# Patient Record
Sex: FEMALE | Race: WHITE | HISPANIC OR LATINO
[De-identification: names, ages, dates, MRNs, and addresses within clinical notes are randomized per-mention and may not be internally consistent; named-entity substitution may affect disease eponyms.]

---

## 2017-12-19 ENCOUNTER — RESULT REVIEW (OUTPATIENT)
Age: 27
End: 2017-12-19

## 2018-05-12 ENCOUNTER — EMERGENCY (EMERGENCY)
Facility: HOSPITAL | Age: 28
LOS: 0 days | Discharge: HOME | End: 2018-05-12
Attending: EMERGENCY MEDICINE | Admitting: EMERGENCY MEDICINE
Payer: MEDICAID

## 2018-05-12 VITALS
SYSTOLIC BLOOD PRESSURE: 109 MMHG | DIASTOLIC BLOOD PRESSURE: 78 MMHG | RESPIRATION RATE: 18 BRPM | HEART RATE: 76 BPM | OXYGEN SATURATION: 100 % | TEMPERATURE: 99 F

## 2018-05-12 VITALS — HEIGHT: 62 IN | WEIGHT: 179.9 LBS

## 2018-05-12 DIAGNOSIS — R30.0 DYSURIA: ICD-10-CM

## 2018-05-12 DIAGNOSIS — N39.0 URINARY TRACT INFECTION, SITE NOT SPECIFIED: ICD-10-CM

## 2018-05-12 LAB
ALBUMIN SERPL ELPH-MCNC: 3.8 G/DL — SIGNIFICANT CHANGE UP (ref 3.3–5)
ALP SERPL-CCNC: 51 U/L — SIGNIFICANT CHANGE UP (ref 40–120)
ALT FLD-CCNC: 29 U/L — SIGNIFICANT CHANGE UP (ref 12–78)
ANION GAP SERPL CALC-SCNC: 7 MMOL/L — SIGNIFICANT CHANGE UP (ref 5–17)
APPEARANCE UR: CLEAR — SIGNIFICANT CHANGE UP
AST SERPL-CCNC: 20 U/L — SIGNIFICANT CHANGE UP (ref 15–37)
BACTERIA # UR AUTO: (no result)
BASOPHILS # BLD AUTO: 0.03 K/UL — SIGNIFICANT CHANGE UP (ref 0–0.2)
BASOPHILS NFR BLD AUTO: 0.5 % — SIGNIFICANT CHANGE UP (ref 0–2)
BILIRUB SERPL-MCNC: 0.4 MG/DL — SIGNIFICANT CHANGE UP (ref 0.2–1.2)
BILIRUB UR-MCNC: NEGATIVE — SIGNIFICANT CHANGE UP
BUN SERPL-MCNC: 16 MG/DL — SIGNIFICANT CHANGE UP (ref 7–23)
CALCIUM SERPL-MCNC: 8.6 MG/DL — SIGNIFICANT CHANGE UP (ref 8.5–10.1)
CHLORIDE SERPL-SCNC: 107 MMOL/L — SIGNIFICANT CHANGE UP (ref 96–108)
CO2 SERPL-SCNC: 25 MMOL/L — SIGNIFICANT CHANGE UP (ref 22–31)
COLOR SPEC: YELLOW — SIGNIFICANT CHANGE UP
CREAT SERPL-MCNC: 0.83 MG/DL — SIGNIFICANT CHANGE UP (ref 0.5–1.3)
DIFF PNL FLD: NEGATIVE — SIGNIFICANT CHANGE UP
EOSINOPHIL # BLD AUTO: 0.22 K/UL — SIGNIFICANT CHANGE UP (ref 0–0.5)
EOSINOPHIL NFR BLD AUTO: 3.5 % — SIGNIFICANT CHANGE UP (ref 0–6)
EPI CELLS # UR: SIGNIFICANT CHANGE UP
GLUCOSE SERPL-MCNC: 95 MG/DL — SIGNIFICANT CHANGE UP (ref 70–99)
GLUCOSE UR QL: NEGATIVE MG/DL — SIGNIFICANT CHANGE UP
HCT VFR BLD CALC: 36.4 % — SIGNIFICANT CHANGE UP (ref 34.5–45)
HGB BLD-MCNC: 12.5 G/DL — SIGNIFICANT CHANGE UP (ref 11.5–15.5)
IMM GRANULOCYTES NFR BLD AUTO: 0.3 % — SIGNIFICANT CHANGE UP (ref 0–1.5)
KETONES UR-MCNC: NEGATIVE — SIGNIFICANT CHANGE UP
LEUKOCYTE ESTERASE UR-ACNC: (no result)
LYMPHOCYTES # BLD AUTO: 1.89 K/UL — SIGNIFICANT CHANGE UP (ref 1–3.3)
LYMPHOCYTES # BLD AUTO: 30 % — SIGNIFICANT CHANGE UP (ref 13–44)
MCHC RBC-ENTMCNC: 29.3 PG — SIGNIFICANT CHANGE UP (ref 27–34)
MCHC RBC-ENTMCNC: 34.3 GM/DL — SIGNIFICANT CHANGE UP (ref 32–36)
MCV RBC AUTO: 85.2 FL — SIGNIFICANT CHANGE UP (ref 80–100)
MONOCYTES # BLD AUTO: 0.57 K/UL — SIGNIFICANT CHANGE UP (ref 0–0.9)
MONOCYTES NFR BLD AUTO: 9 % — SIGNIFICANT CHANGE UP (ref 2–14)
NEUTROPHILS # BLD AUTO: 3.58 K/UL — SIGNIFICANT CHANGE UP (ref 1.8–7.4)
NEUTROPHILS NFR BLD AUTO: 56.7 % — SIGNIFICANT CHANGE UP (ref 43–77)
NITRITE UR-MCNC: NEGATIVE — SIGNIFICANT CHANGE UP
NRBC # BLD: 0 /100 WBCS — SIGNIFICANT CHANGE UP (ref 0–0)
PH UR: 6.5 — SIGNIFICANT CHANGE UP (ref 5–8)
PLATELET # BLD AUTO: 222 K/UL — SIGNIFICANT CHANGE UP (ref 150–400)
POTASSIUM SERPL-MCNC: 3.6 MMOL/L — SIGNIFICANT CHANGE UP (ref 3.5–5.3)
POTASSIUM SERPL-SCNC: 3.6 MMOL/L — SIGNIFICANT CHANGE UP (ref 3.5–5.3)
PROT SERPL-MCNC: 7.5 GM/DL — SIGNIFICANT CHANGE UP (ref 6–8.3)
PROT UR-MCNC: NEGATIVE MG/DL — SIGNIFICANT CHANGE UP
RBC # BLD: 4.27 M/UL — SIGNIFICANT CHANGE UP (ref 3.8–5.2)
RBC # FLD: 12.7 % — SIGNIFICANT CHANGE UP (ref 10.3–14.5)
SODIUM SERPL-SCNC: 139 MMOL/L — SIGNIFICANT CHANGE UP (ref 135–145)
SP GR SPEC: 1.01 — SIGNIFICANT CHANGE UP (ref 1.01–1.02)
UROBILINOGEN FLD QL: NEGATIVE MG/DL — SIGNIFICANT CHANGE UP
WBC # BLD: 6.31 K/UL — SIGNIFICANT CHANGE UP (ref 3.8–10.5)
WBC # FLD AUTO: 6.31 K/UL — SIGNIFICANT CHANGE UP (ref 3.8–10.5)
WBC UR QL: (no result)

## 2018-05-12 PROCEDURE — 99284 EMERGENCY DEPT VISIT MOD MDM: CPT

## 2018-05-12 RX ORDER — NITROFURANTOIN MACROCRYSTAL 50 MG
1 CAPSULE ORAL
Qty: 14 | Refills: 0
Start: 2018-05-12 | End: 2018-05-18

## 2018-05-12 RX ORDER — SODIUM CHLORIDE 9 MG/ML
3 INJECTION INTRAMUSCULAR; INTRAVENOUS; SUBCUTANEOUS ONCE
Qty: 0 | Refills: 0 | Status: COMPLETED | OUTPATIENT
Start: 2018-05-12 | End: 2018-05-12

## 2018-05-12 RX ORDER — NITROFURANTOIN MACROCRYSTAL 50 MG
100 CAPSULE ORAL ONCE
Qty: 0 | Refills: 0 | Status: COMPLETED | OUTPATIENT
Start: 2018-05-12 | End: 2018-05-12

## 2018-05-12 RX ORDER — KETOROLAC TROMETHAMINE 30 MG/ML
30 SYRINGE (ML) INJECTION ONCE
Qty: 0 | Refills: 0 | Status: DISCONTINUED | OUTPATIENT
Start: 2018-05-12 | End: 2018-05-12

## 2018-05-12 RX ADMIN — Medication 30 MILLIGRAM(S): at 21:10

## 2018-05-12 RX ADMIN — Medication 100 MILLIGRAM(S): at 21:50

## 2018-05-12 RX ADMIN — SODIUM CHLORIDE 3 MILLILITER(S): 9 INJECTION INTRAMUSCULAR; INTRAVENOUS; SUBCUTANEOUS at 21:11

## 2018-05-12 NOTE — ED STATDOCS - OBJECTIVE STATEMENT
26 y/o Female with no pertinent PMHx or PSHx presents to ED c/o suprapubic abd pain since 20 minutes ago. Pt was out to dinner when symptoms began. +Dysuria since an hour ago. No N/V/D, no fever, no chills, no back pain. No medications taken PTA. LKMP 8 days ago.

## 2018-05-12 NOTE — ED STATDOCS - ATTENDING CONTRIBUTION TO CARE
Attending Contribution to Care: I, Janelle De La O, performed the initial face to face bedside interview with this patient regarding history of present illness, review of symptoms and relevant past medical, social and family history.  I completed an independent physical examination.  I was the initial provider who evaluated this patient. I have signed out the follow up of any pending tests (i.e. labs, radiological studies) to the ACP.  I have communicated the patient’s plan of care and disposition with the ACP.

## 2019-03-19 NOTE — ED ADULT NURSE NOTE - DOES PATIENT HAVE ADVANCE DIRECTIVE
No
Anxiety    CAD (Coronary Artery Disease)    Depression    Diabetes Mellitus Type II  x 7 yrs  Heart Attack  19 yrs ago  HTN (Hypertension)    Morbid Obesity    Nontoxic Nodular Goiter    Sleep Apnea  cpap - [facility] grp.

## 2020-09-25 ENCOUNTER — APPOINTMENT (OUTPATIENT)
Dept: ANTEPARTUM | Facility: CLINIC | Age: 30
End: 2020-09-25
Payer: MEDICAID

## 2020-09-25 ENCOUNTER — ASOB RESULT (OUTPATIENT)
Age: 30
End: 2020-09-25

## 2020-09-25 PROCEDURE — 76813 OB US NUCHAL MEAS 1 GEST: CPT | Mod: 59

## 2020-11-11 ENCOUNTER — APPOINTMENT (OUTPATIENT)
Dept: ANTEPARTUM | Facility: CLINIC | Age: 30
End: 2020-11-11
Payer: MEDICAID

## 2020-11-11 ENCOUNTER — ASOB RESULT (OUTPATIENT)
Age: 30
End: 2020-11-11

## 2020-11-11 PROBLEM — Z00.00 ENCOUNTER FOR PREVENTIVE HEALTH EXAMINATION: Status: ACTIVE | Noted: 2020-11-11

## 2020-11-11 PROCEDURE — 99072 ADDL SUPL MATRL&STAF TM PHE: CPT

## 2020-11-11 PROCEDURE — 76805 OB US >/= 14 WKS SNGL FETUS: CPT

## 2020-11-16 ENCOUNTER — ASOB RESULT (OUTPATIENT)
Age: 30
End: 2020-11-16

## 2020-11-16 ENCOUNTER — APPOINTMENT (OUTPATIENT)
Dept: ANTEPARTUM | Facility: CLINIC | Age: 30
End: 2020-11-16
Payer: MEDICAID

## 2020-11-16 PROCEDURE — 76816 OB US FOLLOW-UP PER FETUS: CPT

## 2020-11-16 PROCEDURE — 99072 ADDL SUPL MATRL&STAF TM PHE: CPT

## 2021-03-03 ENCOUNTER — ASOB RESULT (OUTPATIENT)
Age: 31
End: 2021-03-03

## 2021-03-03 ENCOUNTER — APPOINTMENT (OUTPATIENT)
Dept: ANTEPARTUM | Facility: CLINIC | Age: 31
End: 2021-03-03
Payer: MEDICAID

## 2021-03-03 PROCEDURE — 76816 OB US FOLLOW-UP PER FETUS: CPT

## 2021-03-03 PROCEDURE — 99072 ADDL SUPL MATRL&STAF TM PHE: CPT

## 2021-03-25 ENCOUNTER — OUTPATIENT (OUTPATIENT)
Dept: INPATIENT UNIT | Facility: HOSPITAL | Age: 31
LOS: 1 days | Discharge: HOME | End: 2021-03-25
Payer: MEDICAID

## 2021-03-25 DIAGNOSIS — O26.899 OTHER SPECIFIED PREGNANCY RELATED CONDITIONS, UNSPECIFIED TRIMESTER: ICD-10-CM

## 2021-03-25 LAB — SARS-COV-2 RNA SPEC QL NAA+PROBE: SIGNIFICANT CHANGE UP

## 2021-03-25 PROCEDURE — U0003: CPT

## 2021-03-25 PROCEDURE — U0005: CPT

## 2021-03-26 ENCOUNTER — INPATIENT (INPATIENT)
Facility: HOSPITAL | Age: 31
LOS: 2 days | Discharge: HOME | DRG: 560 | End: 2021-03-29
Attending: OBSTETRICS & GYNECOLOGY | Admitting: OBSTETRICS & GYNECOLOGY
Payer: MEDICAID

## 2021-03-26 VITALS — HEIGHT: 60 IN | WEIGHT: 178.57 LBS

## 2021-03-26 DIAGNOSIS — O26.899 OTHER SPECIFIED PREGNANCY RELATED CONDITIONS, UNSPECIFIED TRIMESTER: ICD-10-CM

## 2021-03-26 LAB
BASOPHILS # BLD AUTO: 0.01 K/UL — SIGNIFICANT CHANGE UP (ref 0–0.2)
BASOPHILS NFR BLD AUTO: 0.1 % — SIGNIFICANT CHANGE UP (ref 0–2)
EOSINOPHIL # BLD AUTO: 0.06 K/UL — SIGNIFICANT CHANGE UP (ref 0–0.5)
EOSINOPHIL NFR BLD AUTO: 0.9 % — SIGNIFICANT CHANGE UP (ref 0–6)
HCT VFR BLD CALC: 32.5 % — LOW (ref 34.5–45)
HGB BLD-MCNC: 10.6 G/DL — LOW (ref 11.5–15.5)
IMM GRANULOCYTES NFR BLD AUTO: 0.3 % — SIGNIFICANT CHANGE UP (ref 0–1.5)
LYMPHOCYTES # BLD AUTO: 1.76 K/UL — SIGNIFICANT CHANGE UP (ref 1–3.3)
LYMPHOCYTES # BLD AUTO: 25.6 % — SIGNIFICANT CHANGE UP (ref 13–44)
MCHC RBC-ENTMCNC: 28.4 PG — SIGNIFICANT CHANGE UP (ref 27–34)
MCHC RBC-ENTMCNC: 32.6 GM/DL — SIGNIFICANT CHANGE UP (ref 32–36)
MCV RBC AUTO: 87.1 FL — SIGNIFICANT CHANGE UP (ref 80–100)
MONOCYTES # BLD AUTO: 0.68 K/UL — SIGNIFICANT CHANGE UP (ref 0–0.9)
MONOCYTES NFR BLD AUTO: 9.9 % — SIGNIFICANT CHANGE UP (ref 2–14)
NEUTROPHILS # BLD AUTO: 4.34 K/UL — SIGNIFICANT CHANGE UP (ref 1.8–7.4)
NEUTROPHILS NFR BLD AUTO: 63.2 % — SIGNIFICANT CHANGE UP (ref 43–77)
PLATELET # BLD AUTO: 190 K/UL — SIGNIFICANT CHANGE UP (ref 150–400)
RBC # BLD: 3.73 M/UL — LOW (ref 3.8–5.2)
RBC # FLD: 13.9 % — SIGNIFICANT CHANGE UP (ref 10.3–14.5)
WBC # BLD: 6.87 K/UL — SIGNIFICANT CHANGE UP (ref 3.8–10.5)
WBC # FLD AUTO: 6.87 K/UL — SIGNIFICANT CHANGE UP (ref 3.8–10.5)

## 2021-03-26 PROCEDURE — 85014 HEMATOCRIT: CPT

## 2021-03-26 PROCEDURE — C1889: CPT

## 2021-03-26 PROCEDURE — 36415 COLL VENOUS BLD VENIPUNCTURE: CPT

## 2021-03-26 PROCEDURE — 86769 SARS-COV-2 COVID-19 ANTIBODY: CPT

## 2021-03-26 PROCEDURE — 85018 HEMOGLOBIN: CPT

## 2021-03-26 PROCEDURE — 80053 COMPREHEN METABOLIC PANEL: CPT

## 2021-03-26 PROCEDURE — G0463: CPT

## 2021-03-26 PROCEDURE — 83605 ASSAY OF LACTIC ACID: CPT

## 2021-03-26 PROCEDURE — 85384 FIBRINOGEN ACTIVITY: CPT

## 2021-03-26 PROCEDURE — 85025 COMPLETE CBC W/AUTO DIFF WBC: CPT

## 2021-03-26 PROCEDURE — 94760 N-INVAS EAR/PLS OXIMETRY 1: CPT

## 2021-03-26 PROCEDURE — 86901 BLOOD TYPING SEROLOGIC RH(D): CPT

## 2021-03-26 PROCEDURE — 80048 BASIC METABOLIC PNL TOTAL CA: CPT

## 2021-03-26 PROCEDURE — 86850 RBC ANTIBODY SCREEN: CPT

## 2021-03-26 PROCEDURE — 85610 PROTHROMBIN TIME: CPT

## 2021-03-26 PROCEDURE — 59050 FETAL MONITOR W/REPORT: CPT

## 2021-03-26 PROCEDURE — 86780 TREPONEMA PALLIDUM: CPT

## 2021-03-26 PROCEDURE — 86900 BLOOD TYPING SEROLOGIC ABO: CPT

## 2021-03-26 PROCEDURE — 85730 THROMBOPLASTIN TIME PARTIAL: CPT

## 2021-03-26 RX ORDER — BUTORPHANOL TARTRATE 2 MG/ML
2 INJECTION, SOLUTION INTRAMUSCULAR; INTRAVENOUS ONCE
Refills: 0 | Status: DISCONTINUED | OUTPATIENT
Start: 2021-03-26 | End: 2021-03-27

## 2021-03-26 RX ORDER — OXYTOCIN 10 UNIT/ML
333.33 VIAL (ML) INJECTION
Qty: 20 | Refills: 0 | Status: DISCONTINUED | OUTPATIENT
Start: 2021-03-26 | End: 2021-03-29

## 2021-03-26 RX ORDER — SODIUM CHLORIDE 9 MG/ML
1000 INJECTION, SOLUTION INTRAVENOUS
Refills: 0 | Status: DISCONTINUED | OUTPATIENT
Start: 2021-03-26 | End: 2021-03-28

## 2021-03-26 RX ORDER — CITRIC ACID/SODIUM CITRATE 300-500 MG
30 SOLUTION, ORAL ORAL ONCE
Refills: 0 | Status: DISCONTINUED | OUTPATIENT
Start: 2021-03-26 | End: 2021-03-28

## 2021-03-27 ENCOUNTER — TRANSCRIPTION ENCOUNTER (OUTPATIENT)
Age: 31
End: 2021-03-27

## 2021-03-27 LAB
ANION GAP SERPL CALC-SCNC: 10 MMOL/L — SIGNIFICANT CHANGE UP (ref 5–17)
APTT BLD: 28.8 SEC — SIGNIFICANT CHANGE UP (ref 27.5–35.5)
BASOPHILS # BLD AUTO: 0.02 K/UL — SIGNIFICANT CHANGE UP (ref 0–0.2)
BASOPHILS NFR BLD AUTO: 0.2 % — SIGNIFICANT CHANGE UP (ref 0–2)
BUN SERPL-MCNC: 9 MG/DL — SIGNIFICANT CHANGE UP (ref 7–23)
CALCIUM SERPL-MCNC: 8.2 MG/DL — LOW (ref 8.5–10.1)
CHLORIDE SERPL-SCNC: 112 MMOL/L — HIGH (ref 96–108)
CO2 SERPL-SCNC: 18 MMOL/L — LOW (ref 22–31)
COVID-19 SPIKE DOMAIN AB INTERP: POSITIVE
COVID-19 SPIKE DOMAIN ANTIBODY RESULT: 170 U/ML — HIGH
CREAT SERPL-MCNC: 0.69 MG/DL — SIGNIFICANT CHANGE UP (ref 0.5–1.3)
EOSINOPHIL # BLD AUTO: 0 K/UL — SIGNIFICANT CHANGE UP (ref 0–0.5)
EOSINOPHIL NFR BLD AUTO: 0 % — SIGNIFICANT CHANGE UP (ref 0–6)
FIBRINOGEN PPP-MCNC: 478 MG/DL — SIGNIFICANT CHANGE UP (ref 290–520)
GLUCOSE SERPL-MCNC: 91 MG/DL — SIGNIFICANT CHANGE UP (ref 70–99)
HCT VFR BLD CALC: 29.6 % — LOW (ref 34.5–45)
HGB BLD-MCNC: 9.6 G/DL — LOW (ref 11.5–15.5)
IMM GRANULOCYTES NFR BLD AUTO: 0.4 % — SIGNIFICANT CHANGE UP (ref 0–1.5)
INR BLD: 1.04 RATIO — SIGNIFICANT CHANGE UP (ref 0.88–1.16)
LACTATE SERPL-SCNC: 2.5 MMOL/L — HIGH (ref 0.7–2)
LYMPHOCYTES # BLD AUTO: 0.67 K/UL — LOW (ref 1–3.3)
LYMPHOCYTES # BLD AUTO: 5.5 % — LOW (ref 13–44)
MCHC RBC-ENTMCNC: 28.5 PG — SIGNIFICANT CHANGE UP (ref 27–34)
MCHC RBC-ENTMCNC: 32.4 GM/DL — SIGNIFICANT CHANGE UP (ref 32–36)
MCV RBC AUTO: 87.8 FL — SIGNIFICANT CHANGE UP (ref 80–100)
MONOCYTES # BLD AUTO: 1.08 K/UL — HIGH (ref 0–0.9)
MONOCYTES NFR BLD AUTO: 8.9 % — SIGNIFICANT CHANGE UP (ref 2–14)
NEUTROPHILS # BLD AUTO: 10.34 K/UL — HIGH (ref 1.8–7.4)
NEUTROPHILS NFR BLD AUTO: 85 % — HIGH (ref 43–77)
PLATELET # BLD AUTO: 176 K/UL — SIGNIFICANT CHANGE UP (ref 150–400)
POTASSIUM SERPL-MCNC: 4.1 MMOL/L — SIGNIFICANT CHANGE UP (ref 3.5–5.3)
POTASSIUM SERPL-SCNC: 4.1 MMOL/L — SIGNIFICANT CHANGE UP (ref 3.5–5.3)
PROTHROM AB SERPL-ACNC: 12.1 SEC — SIGNIFICANT CHANGE UP (ref 10.6–13.6)
RBC # BLD: 3.37 M/UL — LOW (ref 3.8–5.2)
RBC # FLD: 13.9 % — SIGNIFICANT CHANGE UP (ref 10.3–14.5)
SARS-COV-2 IGG+IGM SERPL QL IA: 170 U/ML — HIGH
SARS-COV-2 IGG+IGM SERPL QL IA: POSITIVE
SODIUM SERPL-SCNC: 140 MMOL/L — SIGNIFICANT CHANGE UP (ref 135–145)
T PALLIDUM AB TITR SER: NEGATIVE — SIGNIFICANT CHANGE UP
WBC # BLD: 12.16 K/UL — HIGH (ref 3.8–10.5)
WBC # FLD AUTO: 12.16 K/UL — HIGH (ref 3.8–10.5)

## 2021-03-27 RX ORDER — AMPICILLIN TRIHYDRATE 250 MG
CAPSULE ORAL
Refills: 0 | Status: DISCONTINUED | OUTPATIENT
Start: 2021-03-27 | End: 2021-03-29

## 2021-03-27 RX ORDER — SIMETHICONE 80 MG/1
80 TABLET, CHEWABLE ORAL EVERY 4 HOURS
Refills: 0 | Status: DISCONTINUED | OUTPATIENT
Start: 2021-03-28 | End: 2021-03-29

## 2021-03-27 RX ORDER — IBUPROFEN 200 MG
600 TABLET ORAL EVERY 6 HOURS
Refills: 0 | Status: COMPLETED | OUTPATIENT
Start: 2021-03-28 | End: 2022-02-24

## 2021-03-27 RX ORDER — INFLUENZA VIRUS VACCINE 15; 15; 15; 15 UG/.5ML; UG/.5ML; UG/.5ML; UG/.5ML
0.5 SUSPENSION INTRAMUSCULAR ONCE
Refills: 0 | Status: DISCONTINUED | OUTPATIENT
Start: 2021-03-27 | End: 2021-03-29

## 2021-03-27 RX ORDER — TETANUS TOXOID, REDUCED DIPHTHERIA TOXOID AND ACELLULAR PERTUSSIS VACCINE, ADSORBED 5; 2.5; 8; 8; 2.5 [IU]/.5ML; [IU]/.5ML; UG/.5ML; UG/.5ML; UG/.5ML
0.5 SUSPENSION INTRAMUSCULAR ONCE
Refills: 0 | Status: DISCONTINUED | OUTPATIENT
Start: 2021-03-28 | End: 2021-03-29

## 2021-03-27 RX ORDER — AMPICILLIN TRIHYDRATE 250 MG
2 CAPSULE ORAL EVERY 6 HOURS
Refills: 0 | Status: DISCONTINUED | OUTPATIENT
Start: 2021-03-28 | End: 2021-03-29

## 2021-03-27 RX ORDER — OXYTOCIN 10 UNIT/ML
333.33 VIAL (ML) INJECTION
Qty: 20 | Refills: 0 | Status: DISCONTINUED | OUTPATIENT
Start: 2021-03-28 | End: 2021-03-29

## 2021-03-27 RX ORDER — OXYTOCIN 10 UNIT/ML
2 VIAL (ML) INJECTION
Qty: 30 | Refills: 0 | Status: DISCONTINUED | OUTPATIENT
Start: 2021-03-27 | End: 2021-03-29

## 2021-03-27 RX ORDER — OXYCODONE HYDROCHLORIDE 5 MG/1
5 TABLET ORAL
Refills: 0 | Status: DISCONTINUED | OUTPATIENT
Start: 2021-03-28 | End: 2021-03-29

## 2021-03-27 RX ORDER — MAGNESIUM HYDROXIDE 400 MG/1
30 TABLET, CHEWABLE ORAL
Refills: 0 | Status: DISCONTINUED | OUTPATIENT
Start: 2021-03-28 | End: 2021-03-29

## 2021-03-27 RX ORDER — DIPHENHYDRAMINE HCL 50 MG
25 CAPSULE ORAL EVERY 6 HOURS
Refills: 0 | Status: DISCONTINUED | OUTPATIENT
Start: 2021-03-28 | End: 2021-03-29

## 2021-03-27 RX ORDER — LANOLIN
1 OINTMENT (GRAM) TOPICAL EVERY 6 HOURS
Refills: 0 | Status: DISCONTINUED | OUTPATIENT
Start: 2021-03-28 | End: 2021-03-29

## 2021-03-27 RX ORDER — HYDROCORTISONE 1 %
1 OINTMENT (GRAM) TOPICAL EVERY 6 HOURS
Refills: 0 | Status: DISCONTINUED | OUTPATIENT
Start: 2021-03-28 | End: 2021-03-29

## 2021-03-27 RX ORDER — DIBUCAINE 1 %
1 OINTMENT (GRAM) RECTAL EVERY 6 HOURS
Refills: 0 | Status: DISCONTINUED | OUTPATIENT
Start: 2021-03-28 | End: 2021-03-29

## 2021-03-27 RX ORDER — KETOROLAC TROMETHAMINE 30 MG/ML
30 SYRINGE (ML) INJECTION ONCE
Refills: 0 | Status: DISCONTINUED | OUTPATIENT
Start: 2021-03-27 | End: 2021-03-27

## 2021-03-27 RX ORDER — OXYCODONE HYDROCHLORIDE 5 MG/1
5 TABLET ORAL ONCE
Refills: 0 | Status: DISCONTINUED | OUTPATIENT
Start: 2021-03-28 | End: 2021-03-29

## 2021-03-27 RX ORDER — GENTAMICIN SULFATE 40 MG/ML
230 VIAL (ML) INJECTION ONCE
Refills: 0 | Status: COMPLETED | OUTPATIENT
Start: 2021-03-28 | End: 2021-03-28

## 2021-03-27 RX ORDER — AMPICILLIN TRIHYDRATE 250 MG
2 CAPSULE ORAL ONCE
Refills: 0 | Status: COMPLETED | OUTPATIENT
Start: 2021-03-27 | End: 2021-03-27

## 2021-03-27 RX ORDER — SODIUM CHLORIDE 9 MG/ML
3 INJECTION INTRAMUSCULAR; INTRAVENOUS; SUBCUTANEOUS EVERY 8 HOURS
Refills: 0 | Status: DISCONTINUED | OUTPATIENT
Start: 2021-03-28 | End: 2021-03-29

## 2021-03-27 RX ORDER — ACETAMINOPHEN 500 MG
1000 TABLET ORAL ONCE
Refills: 0 | Status: COMPLETED | OUTPATIENT
Start: 2021-03-27 | End: 2021-03-27

## 2021-03-27 RX ORDER — OXYTOCIN 10 UNIT/ML
10 VIAL (ML) INJECTION ONCE
Refills: 0 | Status: COMPLETED | OUTPATIENT
Start: 2021-03-27 | End: 2021-03-27

## 2021-03-27 RX ORDER — BENZOCAINE 10 %
1 GEL (GRAM) MUCOUS MEMBRANE EVERY 6 HOURS
Refills: 0 | Status: DISCONTINUED | OUTPATIENT
Start: 2021-03-28 | End: 2021-03-29

## 2021-03-27 RX ORDER — AER TRAVELER 0.5 G/1
1 SOLUTION RECTAL; TOPICAL EVERY 4 HOURS
Refills: 0 | Status: DISCONTINUED | OUTPATIENT
Start: 2021-03-28 | End: 2021-03-29

## 2021-03-27 RX ORDER — COPPER 313.4 MG/1
1 INTRAUTERINE DEVICE INTRAUTERINE ONCE
Refills: 0 | Status: COMPLETED | OUTPATIENT
Start: 2021-03-27 | End: 2021-03-27

## 2021-03-27 RX ORDER — PRAMOXINE HYDROCHLORIDE 150 MG/15G
1 AEROSOL, FOAM RECTAL EVERY 4 HOURS
Refills: 0 | Status: DISCONTINUED | OUTPATIENT
Start: 2021-03-28 | End: 2021-03-29

## 2021-03-27 RX ORDER — GENTAMICIN SULFATE 40 MG/ML
230 VIAL (ML) INJECTION ONCE
Refills: 0 | Status: COMPLETED | OUTPATIENT
Start: 2021-03-27 | End: 2021-03-27

## 2021-03-27 RX ORDER — ACETAMINOPHEN 500 MG
975 TABLET ORAL
Refills: 0 | Status: DISCONTINUED | OUTPATIENT
Start: 2021-03-28 | End: 2021-03-29

## 2021-03-27 RX ADMIN — Medication 100 MILLIGRAM(S): at 22:48

## 2021-03-27 RX ADMIN — Medication 100 MILLIGRAM(S): at 18:18

## 2021-03-27 RX ADMIN — COPPER 1 EACH: 313.4 INTRAUTERINE DEVICE INTRAUTERINE at 18:59

## 2021-03-27 RX ADMIN — Medication 30 MILLIGRAM(S): at 19:21

## 2021-03-27 RX ADMIN — SODIUM CHLORIDE 125 MILLILITER(S): 9 INJECTION, SOLUTION INTRAVENOUS at 00:12

## 2021-03-27 RX ADMIN — BUTORPHANOL TARTRATE 2 MILLIGRAM(S): 2 INJECTION, SOLUTION INTRAMUSCULAR; INTRAVENOUS at 00:45

## 2021-03-27 RX ADMIN — Medication 2 MILLIUNIT(S)/MIN: at 07:56

## 2021-03-27 RX ADMIN — Medication 400 MILLIGRAM(S): at 17:39

## 2021-03-27 RX ADMIN — BUTORPHANOL TARTRATE 2 MILLIGRAM(S): 2 INJECTION, SOLUTION INTRAMUSCULAR; INTRAVENOUS at 00:12

## 2021-03-27 RX ADMIN — Medication 10 UNIT(S): at 20:54

## 2021-03-27 RX ADMIN — Medication 2 GRAM(S): at 17:39

## 2021-03-27 RX ADMIN — Medication 30 MILLIGRAM(S): at 22:48

## 2021-03-27 RX ADMIN — Medication 1000 MILLIGRAM(S): at 17:40

## 2021-03-27 RX ADMIN — Medication 0.2 MILLIGRAM(S): at 20:40

## 2021-03-27 NOTE — DISCHARGE NOTE OB - CARE PROVIDER_API CALL
Hossein Jacob  OBSTETRICS AND GYNECOLOGY  [address] [facility], [address] [place] [address]  Phone: [phone]  Fax: [phone]  Established Patient  Follow Up Time: 1 month

## 2021-03-27 NOTE — CHART NOTE - NSCHARTNOTEFT_GEN_A_CORE
Patient had some VB s/p delivery  150cc of clots expressed on bimanual exam  Uterine fundus firm  VSS  Will straight cath to empty bladder and give methergine IM x1    D/W Dr. Hoff Patient had some VB s/p delivery  Patient feeling well, denies weakness or dizziness   300cc of clots expressed on bimanual exam  Uterine fundus firm  VSS  Will straight cath to empty bladder resulted in 550cc  Methergine IM x1, cytotec 1000 DE and 10IM pit given  STAT labs drawn  Continue to monitor     D/W Dr. Hoff Patient had some VB s/p delivery  Patient feeling well, denies weakness or dizziness   Clots expressed on bimanual exam - QBL 1125cc (in addition to 300cc at delivery)  Uterine fundus firm  VSS  Will straight cath to empty bladder resulted in 550cc  Methergine IM x1, cytotec 1000 NJ and 10IM pit given  STAT labs drawn  Continue to monitor     D/W Dr. Hoff

## 2021-03-27 NOTE — DISCHARGE NOTE OB - MEDICATION SUMMARY - MEDICATIONS TO TAKE
I will START or STAY ON the medications listed below when I get home from the hospital:    acetaminophen 325 mg oral tablet  -- 3 tab(s) by mouth   -- Indication: For pain    ibuprofen 600 mg oral tablet  -- 1 tab(s) by mouth every 6 hours, As needed, Temp greater or equal to 38C (100.4F), Mild Pain (1 - 3), Moderate Pain (4 - 6), Severe Pain (7 - 10)  -- Indication: For pain    Prenatal 19 ([place]) oral tablet  -- 1 tab(s) by mouth once a day  -- Indication: For vitamin

## 2021-03-27 NOTE — DISCHARGE NOTE OB - PLAN OF CARE
Please call your provider in 4-6 weeks to schedule your post partum visit. Take medications as directed, regular diet, activity as tolerated. Exclusive breast feeding for the first 6 months is recommended. Nothing per vagina for 6 weeks (incl. sex, douching, etc). If you have additional concerns, please inform your provider. rapid recovery

## 2021-03-27 NOTE — CHART NOTE - NSCHARTNOTEFT_GEN_A_CORE
Patient continues to have VB  Additional 50-100cc of clots removed  IUD removed  Will send BMP/lactate   Continue amp/gent 24hr and add clinda                           9.6    12.16 )-----------( 176      ( 27 Mar 2021 21:15 )             29.6     PT/INR - ( 27 Mar 2021 21:15 )   PT: 12.1 sec;   INR: 1.04 ratio       PTT - ( 27 Mar 2021 21:15 )  PTT:28.8 sec    D/W Dr. Hoff Patient continues to have VB  Additional 50-100cc of clots removed  IUD removed  Will send BMP/lactate   Continue amp/gent 24hr and add clinda                           9.6    12.16 )-----------( 176      ( 27 Mar 2021 21:15 )             29.6     PT/INR - ( 27 Mar 2021 21:15 )   PT: 12.1 sec;   INR: 1.04 ratio       PTT - ( 27 Mar 2021 21:15 )  PTT:28.8 sec    D/W Dr. Hoff      Attending Note     Patient assessed due to continued vaginal bleeding and sensation of chills. Repeat vaginal exam expressed small amount of clot and bleeding noted on pad. Bimanual massage performed and fundus firm.   Due to chorioamnionitis during labor and postpartum hemorrhage, IUD removed without difficulty.   Informed patient IUD will be replaced at PP visit in 6 wks. Expressed understanding   Plan   -BMP and lactate   -antibiotics x 24h PP   -blood and urine cultures if febrile     BEA Hoff

## 2021-03-27 NOTE — DISCHARGE NOTE OB - PATIENT PORTAL LINK FT
You can access the FollowMyHealth Patient Portal offered by [facility] by registering at the following website: [email or web address]. By joining [employer]’s FollowMyHealth portal, you will also be able to view your health information using other applications (apps) compatible with our system.

## 2021-03-27 NOTE — PROVIDER CONTACT NOTE (CRITICAL VALUE NOTIFICATION) - ASSESSMENT
Pt received from [employer] [employer] at 2352, vss, Temp 99.2, pt denies any complaints at present.

## 2021-03-27 NOTE — DISCHARGE NOTE OB - HOSPITAL COURSE
31 yo  at 39 4/7 weeks gestation presented c/o painful contractions yesterday, admitted at cervical dilation of 1.5cm.  She was then induced for prodromal labor with a cervical ripening balloon and cytotec.  Her CBC upon admission:                        10.6   6.87  )-----------( 190      ( 26 Mar 2021 23:25 )             32.5   COVID-19 PCR negative.  She progressed to 4cm when pitocin augmentation was started.  Amniorrhexis was performed at 11:16 am- light meconium stained fluid was seen.  Epidural anaesthesia was given for analgesia.  Pt progressed to full dilatation.

## 2021-03-27 NOTE — DISCHARGE NOTE OB - CARE PLAN
Principal Discharge DX:	 (normal spontaneous vaginal delivery)  Goal:	rapid recovery  Assessment and plan of treatment:	Please call your provider in 4-6 weeks to schedule your post partum visit. Take medications as directed, regular diet, activity as tolerated. Exclusive breast feeding for the first 6 months is recommended. Nothing per vagina for 6 weeks (incl. sex, douching, etc). If you have additional concerns, please inform your provider.

## 2021-03-28 LAB
ALBUMIN SERPL ELPH-MCNC: 2 G/DL — LOW (ref 3.3–5)
ALP SERPL-CCNC: 175 U/L — HIGH (ref 40–120)
ALT FLD-CCNC: 22 U/L — SIGNIFICANT CHANGE UP (ref 12–78)
ANION GAP SERPL CALC-SCNC: 4 MMOL/L — LOW (ref 5–17)
APTT BLD: 27.7 SEC — SIGNIFICANT CHANGE UP (ref 27.5–35.5)
AST SERPL-CCNC: 24 U/L — SIGNIFICANT CHANGE UP (ref 15–37)
BASOPHILS # BLD AUTO: 0.02 K/UL — SIGNIFICANT CHANGE UP (ref 0–0.2)
BASOPHILS NFR BLD AUTO: 0.2 % — SIGNIFICANT CHANGE UP (ref 0–2)
BILIRUB SERPL-MCNC: 0.5 MG/DL — SIGNIFICANT CHANGE UP (ref 0.2–1.2)
BUN SERPL-MCNC: 10 MG/DL — SIGNIFICANT CHANGE UP (ref 7–23)
CALCIUM SERPL-MCNC: 8.2 MG/DL — LOW (ref 8.5–10.1)
CHLORIDE SERPL-SCNC: 111 MMOL/L — HIGH (ref 96–108)
CO2 SERPL-SCNC: 23 MMOL/L — SIGNIFICANT CHANGE UP (ref 22–31)
COVID-19 SPIKE DOMAIN AB INTERP: POSITIVE
COVID-19 SPIKE DOMAIN ANTIBODY RESULT: 130 U/ML — HIGH
CREAT SERPL-MCNC: 0.77 MG/DL — SIGNIFICANT CHANGE UP (ref 0.5–1.3)
EOSINOPHIL # BLD AUTO: 0.04 K/UL — SIGNIFICANT CHANGE UP (ref 0–0.5)
EOSINOPHIL NFR BLD AUTO: 0.4 % — SIGNIFICANT CHANGE UP (ref 0–6)
GLUCOSE SERPL-MCNC: 61 MG/DL — LOW (ref 70–99)
HCT VFR BLD CALC: 27.2 % — LOW (ref 34.5–45)
HGB BLD-MCNC: 8.9 G/DL — LOW (ref 11.5–15.5)
IMM GRANULOCYTES NFR BLD AUTO: 0.4 % — SIGNIFICANT CHANGE UP (ref 0–1.5)
INR BLD: 1.09 RATIO — SIGNIFICANT CHANGE UP (ref 0.88–1.16)
LACTATE SERPL-SCNC: 1.4 MMOL/L — SIGNIFICANT CHANGE UP (ref 0.7–2)
LYMPHOCYTES # BLD AUTO: 1.54 K/UL — SIGNIFICANT CHANGE UP (ref 1–3.3)
LYMPHOCYTES # BLD AUTO: 16.7 % — SIGNIFICANT CHANGE UP (ref 13–44)
MCHC RBC-ENTMCNC: 28.5 PG — SIGNIFICANT CHANGE UP (ref 27–34)
MCHC RBC-ENTMCNC: 32.7 GM/DL — SIGNIFICANT CHANGE UP (ref 32–36)
MCV RBC AUTO: 87.2 FL — SIGNIFICANT CHANGE UP (ref 80–100)
MONOCYTES # BLD AUTO: 0.93 K/UL — HIGH (ref 0–0.9)
MONOCYTES NFR BLD AUTO: 10.1 % — SIGNIFICANT CHANGE UP (ref 2–14)
NEUTROPHILS # BLD AUTO: 6.67 K/UL — SIGNIFICANT CHANGE UP (ref 1.8–7.4)
NEUTROPHILS NFR BLD AUTO: 72.2 % — SIGNIFICANT CHANGE UP (ref 43–77)
PLATELET # BLD AUTO: 157 K/UL — SIGNIFICANT CHANGE UP (ref 150–400)
POTASSIUM SERPL-MCNC: 4.2 MMOL/L — SIGNIFICANT CHANGE UP (ref 3.5–5.3)
POTASSIUM SERPL-SCNC: 4.2 MMOL/L — SIGNIFICANT CHANGE UP (ref 3.5–5.3)
PROT SERPL-MCNC: 5.1 GM/DL — LOW (ref 6–8.3)
PROTHROM AB SERPL-ACNC: 12.6 SEC — SIGNIFICANT CHANGE UP (ref 10.6–13.6)
RBC # BLD: 3.12 M/UL — LOW (ref 3.8–5.2)
RBC # FLD: 14.1 % — SIGNIFICANT CHANGE UP (ref 10.3–14.5)
SARS-COV-2 IGG+IGM SERPL QL IA: 130 U/ML — HIGH
SARS-COV-2 IGG+IGM SERPL QL IA: POSITIVE
SODIUM SERPL-SCNC: 138 MMOL/L — SIGNIFICANT CHANGE UP (ref 135–145)
WBC # BLD: 9.24 K/UL — SIGNIFICANT CHANGE UP (ref 3.8–10.5)
WBC # FLD AUTO: 9.24 K/UL — SIGNIFICANT CHANGE UP (ref 3.8–10.5)

## 2021-03-28 RX ORDER — OXYCODONE HYDROCHLORIDE 5 MG/1
5 TABLET ORAL ONCE
Refills: 0 | Status: DISCONTINUED | OUTPATIENT
Start: 2021-03-28 | End: 2021-03-29

## 2021-03-28 RX ORDER — IBUPROFEN 200 MG
600 TABLET ORAL EVERY 6 HOURS
Refills: 0 | Status: DISCONTINUED | OUTPATIENT
Start: 2021-03-28 | End: 2021-03-29

## 2021-03-28 RX ADMIN — Medication 0.2 MILLIGRAM(S): at 06:16

## 2021-03-28 RX ADMIN — Medication 100 MILLIGRAM(S): at 18:28

## 2021-03-28 RX ADMIN — Medication 216 GRAM(S): at 06:42

## 2021-03-28 RX ADMIN — Medication 0.2 MILLIGRAM(S): at 00:28

## 2021-03-28 RX ADMIN — Medication 0.2 MILLIGRAM(S): at 12:10

## 2021-03-28 RX ADMIN — Medication 975 MILLIGRAM(S): at 09:20

## 2021-03-28 RX ADMIN — Medication 100 MILLIGRAM(S): at 06:16

## 2021-03-28 RX ADMIN — Medication 216 GRAM(S): at 12:09

## 2021-03-28 RX ADMIN — Medication 600 MILLIGRAM(S): at 06:42

## 2021-03-28 RX ADMIN — Medication 1 TABLET(S): at 09:19

## 2021-03-28 RX ADMIN — Medication 600 MILLIGRAM(S): at 12:10

## 2021-03-28 RX ADMIN — Medication 216 GRAM(S): at 17:38

## 2021-03-28 RX ADMIN — Medication 975 MILLIGRAM(S): at 21:21

## 2021-03-28 RX ADMIN — Medication 975 MILLIGRAM(S): at 16:06

## 2021-03-28 RX ADMIN — Medication 0.2 MILLIGRAM(S): at 18:28

## 2021-03-28 RX ADMIN — Medication 975 MILLIGRAM(S): at 22:00

## 2021-03-28 RX ADMIN — Medication 600 MILLIGRAM(S): at 05:58

## 2021-03-28 RX ADMIN — Medication 600 MILLIGRAM(S): at 18:28

## 2021-03-28 RX ADMIN — Medication 216 GRAM(S): at 00:33

## 2021-03-28 RX ADMIN — Medication 975 MILLIGRAM(S): at 16:05

## 2021-03-28 RX ADMIN — Medication 100 MILLIGRAM(S): at 13:12

## 2021-03-28 RX ADMIN — SODIUM CHLORIDE 3 MILLILITER(S): 9 INJECTION INTRAMUSCULAR; INTRAVENOUS; SUBCUTANEOUS at 21:22

## 2021-03-28 NOTE — PROGRESS NOTE ADULT - SUBJECTIVE AND OBJECTIVE BOX
PPD # 1  Pt without complaints  Vital Signs Last 24 Hrs  T(C): 37 (28 Mar 2021 05:47), Max: 37.3 (27 Mar 2021 23:57)  T(F): 98.6 (28 Mar 2021 05:47), Max: 99.2 (27 Mar 2021 23:57)  HR: 82 (28 Mar 2021 05:47) (59 - 88)  BP: 116/81 (28 Mar 2021 05:47) (104/54 - 140/58)  BP(mean): --  RR: 18 (28 Mar 2021 05:47) (16 - 18)  SpO2: 97% (28 Mar 2021 05:47) (97% - 98%)  abdomen soft, utx firm/NT   small amount of lochia  extremities 2+edema, NT    Labs                        8.9    9.24  )-----------( 157      ( 28 Mar 2021 07:44 )             27.2     A/P s/p forceps delivery, complicated by shoulder dystocia and PPH, EBL 1,400 cc. s/p cytotec, pitocin and methergine. Stable. Uterus is firm with minimal vaginal bleeding. Afebrile. H/H stable. Continue to observe.

## 2021-03-28 NOTE — PROGRESS NOTE ADULT - ASSESSMENT
Patient is a 31yo  s/p  at 39w4d post partum day#1.  Delivery complicated by shoulder dystocia, forceps assist, chorioamnionitis and post partum hemorrhage.     #Post partum  Continue the current pain medication.   Encourage ambulation and a regular diet.   Encourage mother baby bonding.  : voiding spontaneously.  GI: +flatus, stool softeners PRN.  DVTppx: ambulation.  Tentative d/c planning for PPD#2-3.    #Chorioamnionitis  Febrile during labor  On amp/gent/clinda for 24hr  WBC 6.9->12.2, f/u AM labs  Lactate 2.5->2.1, repeat AM labs and continue IV hydration   Patient has been afebrile, if spikes, consider cultures    #PPH  Uterine atony post partum, requiring uterotonics   Now firm fundus and minimal VB  F/U AM hgb and continue to monitor  Patient is a 31yo  s/p  at 39w4d post partum day#1.  Delivery complicated by shoulder dystocia, forceps assist, chorioamnionitis and post partum hemorrhage.     #Post partum  Continue the current pain medication.   Encourage ambulation and a regular diet.   Encourage mother baby bonding.  : voiding spontaneously.  GI: -flatus, stool softeners PRN.  DVTppx: ambulation.  Tentative d/c planning for PPD#2-3.    #Chorioamnionitis  Febrile during labor  On amp/gent/clinda for 24hr  WBC 6.9->12.2, f/u AM labs  Lactate 2.5->2.1, repeat AM labs and continue IV hydration   Patient has been afebrile, if spikes, consider cultures    #PPH  Uterine atony post partum, requiring uterotonics   Now firm fundus and minimal VB  F/U AM Hgb and continue to monitor

## 2021-03-28 NOTE — PROGRESS NOTE ADULT - SUBJECTIVE AND OBJECTIVE BOX
Postpartum Note Vaginal Delivery  Patient is a 29yo  s/p  at 39w4d post partum day#1.  Delivery complicated by shoulder dystocia, forceps assist, chorioamnionitis and post partum hemorrhage.     Subjective:  No acute events overnight.   Patient is tolerating diet and denies N/V.   Patient still has slight vaginal bleeding which is decreasing in amount.   She is breastfeeding and the baby is latching on.    Urinating appropriately.   -BM/+flatus.    Physical exam:  Vital Signs Last 24 Hrs  T(C): 37.3 (27 Mar 2021 23:57), Max: 37.3 (27 Mar 2021 23:57)  T(F): 99.2 (27 Mar 2021 23:57), Max: 99.2 (27 Mar 2021 23:57)  HR: 76 (27 Mar 2021 23:57) (59 - 88)  BP: 119/73 (27 Mar 2021 23:57) (104/54 - 140/58)  RR: 18 (27 Mar 2021 23:57) (16 - 18)  SpO2: 98% (27 Mar 2021 23:57) (98% - 98%)    General: NAD  Heart: RRR  Lungs: CTABL  Breast: non tender, not engorged   Abdomen: Soft, nontender, no distension, firm uterine fundus  Ext: No DVT signs, warm extremities    LABS:                        9.6    12.16 )-----------( 176      ( 27 Mar 2021 21:15 )             29.6     -    140  |  112<H>  |  9   ----------------------------<  91  4.1   |  18<L>  |  0.69    Ca    8.2<L>      27 Mar 2021 22:40    Lactate, Blood: 2.5: Elevated lactate. Consider ordering follow-up lactate to trend. mmol/L (21 @ 22:40)  Lactate, Blood: 2.1: Elevated lactate. Consider ordering follow-up lactate to trend. mmol/L (21 @ 01:56)    PT/INR - ( 27 Mar 2021 21:15 )   PT: 12.1 sec;   INR: 1.04 ratio       PTT - ( 27 Mar 2021 21:15 )  PTT:28.8 sec         Postpartum Note Vaginal Delivery  Patient is a 29yo  s/p  at 39w4d post partum day#1.  Delivery complicated by shoulder dystocia, forceps assist, chorioamnionitis and post partum hemorrhage.     Subjective:  No acute events overnight.   Patient is tolerating diet and denies N/V.   Patient still has slight vaginal bleeding which is decreasing in amount.   She is breastfeeding and the baby is latching on.    Urinating appropriately.   -BM/-flatus.    Physical exam:  Vital Signs Last 24 Hrs  T(C): 37.3 (27 Mar 2021 23:57), Max: 37.3 (27 Mar 2021 23:57)  T(F): 99.2 (27 Mar 2021 23:57), Max: 99.2 (27 Mar 2021 23:57)  HR: 76 (27 Mar 2021 23:57) (59 - 88)  BP: 119/73 (27 Mar 2021 23:57) (104/54 - 140/58)  RR: 18 (27 Mar 2021 23:57) (16 - 18)  SpO2: 98% (27 Mar 2021 23:57) (98% - 98%)    General: NAD  Heart: RRR  Lungs: CTABL  Breast: non tender, not engorged   Abdomen: Soft, nontender, no distension, firm uterine fundus  Ext: No DVT signs, warm extremities    LABS:                        9.6    12.16 )-----------( 176      ( 27 Mar 2021 21:15 )             29.6     -    140  |  112<H>  |  9   ----------------------------<  91  4.1   |  18<L>  |  0.69    Ca    8.2<L>      27 Mar 2021 22:40    Lactate, Blood: 2.5: Elevated lactate. Consider ordering follow-up lactate to trend. mmol/L (21 @ 22:40)  Lactate, Blood: 2.1: Elevated lactate. Consider ordering follow-up lactate to trend. mmol/L (21 @ 01:56)    PT/INR - ( 27 Mar 2021 21:15 )   PT: 12.1 sec;   INR: 1.04 ratio       PTT - ( 27 Mar 2021 21:15 )  PTT:28.8 sec

## 2021-03-29 VITALS
OXYGEN SATURATION: 98 % | SYSTOLIC BLOOD PRESSURE: 95 MMHG | RESPIRATION RATE: 18 BRPM | DIASTOLIC BLOOD PRESSURE: 62 MMHG | TEMPERATURE: 97 F | HEART RATE: 74 BPM

## 2021-03-29 LAB
HCT VFR BLD CALC: 23.8 % — LOW (ref 34.5–45)
HGB BLD-MCNC: 7.6 G/DL — LOW (ref 11.5–15.5)

## 2021-03-29 RX ORDER — IBUPROFEN 200 MG
1 TABLET ORAL
Qty: 0 | Refills: 0 | DISCHARGE
Start: 2021-03-29

## 2021-03-29 RX ORDER — ACETAMINOPHEN 500 MG
3 TABLET ORAL
Qty: 0 | Refills: 0 | DISCHARGE
Start: 2021-03-29

## 2021-03-29 RX ADMIN — Medication 1 TABLET(S): at 09:32

## 2021-03-29 RX ADMIN — Medication 975 MILLIGRAM(S): at 09:32

## 2021-03-29 RX ADMIN — Medication 975 MILLIGRAM(S): at 10:15

## 2021-03-29 RX ADMIN — Medication 975 MILLIGRAM(S): at 15:50

## 2021-03-29 RX ADMIN — SODIUM CHLORIDE 3 MILLILITER(S): 9 INJECTION INTRAMUSCULAR; INTRAVENOUS; SUBCUTANEOUS at 05:52

## 2021-03-29 RX ADMIN — Medication 975 MILLIGRAM(S): at 15:51

## 2021-03-29 RX ADMIN — Medication 600 MILLIGRAM(S): at 00:06

## 2021-03-29 RX ADMIN — Medication 600 MILLIGRAM(S): at 12:38

## 2021-03-29 RX ADMIN — Medication 600 MILLIGRAM(S): at 06:00

## 2021-03-29 RX ADMIN — Medication 216 GRAM(S): at 00:06

## 2021-03-29 RX ADMIN — Medication 216 GRAM(S): at 05:58

## 2021-03-29 RX ADMIN — Medication 600 MILLIGRAM(S): at 01:00

## 2021-03-29 RX ADMIN — Medication 600 MILLIGRAM(S): at 13:20

## 2021-03-29 NOTE — PROGRESS NOTE ADULT - SUBJECTIVE AND OBJECTIVE BOX
Name: HERVE VIDAL  MRN: [id number]  Date Admitted: 21  Location: [facility] [place] 241 01 ([facility] [place])  Attending: Hossein Jacob Chau    All: No Known Allergies    Post Partum: Vaginal Delivery Progress Note    HERVE BOWSER is a 30y  s/p  PPD #2 of a viable infant. Intrapartum course complicated by chorioamnionitis and post-partum hemorrhage requiring cytotec and IM pitocin.     SUBJECTIVE:  No acute events overnight. Pain is well controlled with PRN pain medication. No problems with ambulating, voiding, or PO intake. Has had flatus but no BM. Denies N/V. Patient is having normal lochia which is decreasing.      OBJECTIVE:  Physical exam:  General: AOx3, NAD.  Heart: RRR. S1S2.  Lungs: CTABL. Good airflow bilaterally.   Abdomen: +BS, Soft, appropriately tender to palpitation, firm uterine fundus at umbilicus.  Ext: No calf tenderness bilaterally, Ugo's sign negative, warm extremities.    Vital Signs Last 24 Hrs  T(C): 36.8 (28 Mar 2021 23:40), Max: 37 (28 Mar 2021 12:00)  T(F): 98.3 (28 Mar 2021 23:40), Max: 98.6 (28 Mar 2021 12:00)  HR: 66 (28 Mar 2021 23:40) (64 - 80)  BP: 102/67 (28 Mar 2021 23:40) (100/60 - 120/65)  RR: 16 (28 Mar 2021 23:40) (16 - 18)  SpO2: 99% (28 Mar 2021 23:40) (97% - 99%)    LABS:                        7.6    x     )-----------( x        ( 29 Mar 2021 07:41 )             23.8

## 2021-03-29 NOTE — PROGRESS NOTE ADULT - ASSESSMENT
30y  s/p  PPD #2 of a viable infant. Intrapartum course complicated by chorioamnionitis and post-partum hemorrhage requiring cytotec and IM pitocin.  Post-partum H&H reviewed, Hgb 7.6. Asymptomatic. No headache or dizziness. VSS.     Plan:  - Routine post-partum care.  - Encourage ambulation - if pt is not ambulating please use SCDs for DVT ppx.  - Regular diet.  - Encourage mother-baby interaction.  - Plan for discharge today 30y  s/p  PPD #2 of a viable infant. Intrapartum course complicated by chorioamnionitis and post-partum hemorrhage requiring cytotec and IM pitocin.  Post-partum H&H reviewed, Hgb 7.6. Asymptomatic. No headache or dizziness. VSS.     Plan:  - Routine post-partum care.  - Encourage ambulation - if pt is not ambulating please use SCDs for DVT ppx.  - Regular diet.  - Encourage mother-baby interaction.  - Plan for discharge today    d/w Dr. Jacob

## 2021-04-01 DIAGNOSIS — O47.9 FALSE LABOR, UNSPECIFIED: ICD-10-CM

## 2021-04-01 DIAGNOSIS — Z3A.39 39 WEEKS GESTATION OF PREGNANCY: ICD-10-CM

## 2021-04-01 DIAGNOSIS — O41.1230 CHORIOAMNIONITIS, THIRD TRIMESTER, NOT APPLICABLE OR UNSPECIFIED: ICD-10-CM

## 2021-04-01 DIAGNOSIS — O47.1 FALSE LABOR AT OR AFTER 37 COMPLETED WEEKS OF GESTATION: ICD-10-CM

## 2024-02-07 ENCOUNTER — ASOB RESULT (OUTPATIENT)
Age: 34
End: 2024-02-07

## 2024-02-07 ENCOUNTER — APPOINTMENT (OUTPATIENT)
Dept: ANTEPARTUM | Facility: CLINIC | Age: 34
End: 2024-02-07
Payer: MEDICAID

## 2024-02-07 PROCEDURE — 76813 OB US NUCHAL MEAS 1 GEST: CPT

## 2024-03-21 ENCOUNTER — EMERGENCY (EMERGENCY)
Facility: HOSPITAL | Age: 34
LOS: 0 days | Discharge: HOME | End: 2024-03-21
Attending: EMERGENCY MEDICINE
Payer: MEDICAID

## 2024-03-21 VITALS — HEIGHT: 63 IN | WEIGHT: 179.9 LBS

## 2024-03-21 VITALS — OXYGEN SATURATION: 99 % | RESPIRATION RATE: 18 BRPM | HEART RATE: 103 BPM

## 2024-03-21 DIAGNOSIS — O98.512 OTHER VIRAL DISEASES COMPLICATING PREGNANCY, SECOND TRIMESTER: ICD-10-CM

## 2024-03-21 DIAGNOSIS — B34.9 VIRAL INFECTION, UNSPECIFIED: ICD-10-CM

## 2024-03-21 DIAGNOSIS — Z3A.16 16 WEEKS GESTATION OF PREGNANCY: ICD-10-CM

## 2024-03-21 DIAGNOSIS — B97.89 OTHER VIRAL AGENTS AS THE CAUSE OF DISEASES CLASSIFIED ELSEWHERE: ICD-10-CM

## 2024-03-21 DIAGNOSIS — M79.10 MYALGIA, UNSPECIFIED SITE: ICD-10-CM

## 2024-03-21 PROCEDURE — 99283 EMERGENCY DEPT VISIT LOW MDM: CPT

## 2024-03-21 PROCEDURE — 99284 EMERGENCY DEPT VISIT MOD MDM: CPT | Mod: 25

## 2024-03-21 RX ORDER — ACETAMINOPHEN 500 MG
975 TABLET ORAL ONCE
Refills: 0 | Status: COMPLETED | OUTPATIENT
Start: 2024-03-21 | End: 2024-03-21

## 2024-03-21 RX ADMIN — Medication 975 MILLIGRAM(S): at 04:24

## 2024-03-21 RX ADMIN — Medication 75 MILLIGRAM(S): at 04:24

## 2024-03-21 NOTE — ED ADULT NURSE NOTE - OBJECTIVE STATEMENT
The pt is a 32 y/o female ambulatory A&OX4 presenting to the ED c/o sore throat, ear pain, fever, n/v X2 days. Pt reports that she is 4 months pregnant. Pt denies any pregnancy complications. Pt denies CP, SOB. The pt is a 32 y/o female ambulatory A&OX4 presenting to the ED c/o sore throat, ear pain, fever, n/v X2 days. Pt reports that she is 4 months pregnant. Pt denies any pregnancy complications. Pt denies CP, SOB.  used.

## 2024-03-21 NOTE — ED PROVIDER NOTE - NSFOLLOWUPINSTRUCTIONS_ED_ALL_ED_FT
Paciente: HERVE BOWSER  Profesional que asiste al paciente: ConnorLuis  Enfermedades virales en los adultos  Viral Illness, Adult    Los virus son gérmenes diminutos que entran en el organismo de angela persona y causan enfermedades. Hay muchos tipos de virus diferentes y causan muchas clases de enfermedades. Las enfermedades virales pueden ser leves o graves. Pueden afectar diferentes partes del cuerpo.    Entre las enfermedades frecuentes causadas por virus se incluyen los resfríos y la gripe. Además, las enfermedades virales abarcan afecciones graves, kim la infección por el VIH (virus de inmunodeficiencia humana) y el sida (síndrome de inmunodeficiencia adquirida). Se florez identificado unos pocos virus asociados con determinados tipos de cáncer.    ¿Cuáles son las causas?  Muchos tipos de virus pueden causar enfermedades. Los virus invaden las células del organismo, se multiplican y provocan que las células infectadas funcionen de manera anormal o mueran. Cuando estas células mueren, liberan más virus. Cuando esto ocurre, aparecen síntomas de la enfermedad, y el virus sigue diseminándose a otras células. Si el virus asume la función de la célula, puede hacer que esta se divida y prolifere de manera descontrolada. Trail ocurre cuando un virus causa cáncer.    Los diferentes virus ingresan al organismo de distintas formas. Puede contraer un virus de la siguiente manera:    Al ingerir alimentos o beber agua que florez entrado en contacto con el virus (están contaminados).  Al inhalar gotitas que angela persona infectada liberó en el aire al toser o estornudar.  Al tocar angela superficie contaminada con el virus y luego llevarse la mano a la boca, la nariz o los ojos.  Al ser venus por un insecto o mordido por un animal que son portadores del virus.  Al tener contacto sexual con angela persona infectada por el virus.  Al tener contacto con britney o líquidos que contienen el virus, ya sea a través de un lucila abierto o hank angela transfusión.    Si el virus ingresa al organismo, el sistema de defensa del cuerpo (sistema inmunitario) intentará combatirlo. Puede correr un riesgo más alto de tener angela enfermedad viral si tiene el sistema inmunitario debilitado.    ¿Cuáles son los signos o síntomas?  Puede tener los siguientes síntomas, dependiendo del tipo de virus y de la ubicación de las células que invade:    Virus del resfrío y de la gripe:    Fiebre.  Dolor de mahogany.  Dolor de garganta.  Kimberly musculares.  Nariz tapada (congestión nasal).  Tos.  Virus del aparato digestivo (gastrointestinales):    Fiebre.  Dolor en el abdomen.  Náuseas.  Diarrea.  Virus hepáticos (hepatitis):    Pérdida del apetito.  Cansancio.  La piel o las partes carola de los ojos se ponen elisabet (ictericia).  Virus del cerebro y la médula pinto:    Fiebre.  Dolor de mahogany.  Rigidez en el maxime.  Náuseas y vómitos.  Confusión o somnolencia.  Virus de la piel:    Verrugas.  Picazón.  Erupción cutánea.  Virus de transmisión sexual:    Secreción.  Hinchazón.  Enrojecimiento.  Erupción cutánea.    ¿Cómo se diagnostica?  Esta afección se puede diagnosticar en función de lo siguiente:    Síntomas.  Antecedentes médicos.  Examen físico.  Análisis de britney, angela muestra de mucosidad de los pulmones (muestra de esputo), muestra de heces o un hisopado de líquidos corporales o angela llaga de la piel (lesión).    ¿Cómo se trata?  Los virus pueden ser difíciles de tratar porque se hospedan en las células. Los antibióticos no tratan los virus porque estos medicamentos no llegan al interior de las células. El tratamiento de angela enfermedad viral puede incluir lo siguiente:    Descansar y beber abundantes líquidos.  Medicamentos para aliviar los síntomas. Estos pueden incluir medicamentos de venta james para el dolor y la fiebre, medicamentos para la tos o la congestión y medicamentos para aliviar la diarrea.  Medicamentos antivirales. Estos medicamentos están disponibles únicamente para ciertos tipos de virus. Pueden ayudar a aliviar los síntomas de la gripe, si se shyanne apenas comienza la infección. También hay medicamentos antivirales para la hepatitis y para el VIH y el sida.    Algunas enfermedades virales pueden evitarse con vacunas. Un ejemplo frecuente es la vacuna antigripal.    Siga estas instrucciones en [place] casa:      Medicamentos    Use los medicamentos de venta james y los recetados solamente kim se lo haya indicado el médico.  Si le recetaron un medicamento antiviral, tómelo kim se lo haya indicado el médico. No deje de jeet el antiviral aunque comience a sentirse mejor.  Infórmese sobre cuándo los antibióticos son necesarios y cuándo no. Los antibióticos no combaten a los virus. Si tiene angela infección viral y el médico chani que también tiene angela infección bacteriana, o que está en riesgo de contraerla, partha vez le recete un antibiótico.    No solicite angela receta para antibióticos si le florez diagnosticado angela enfermedad viral. Los antibióticos no harán que se cure más rápidamente.  Jeet antibióticos con frecuencia cuando no son necesarios puede derivar en resistencia a los antibióticos. Cuando esto ocurre, el medicamento pierde how[place] eficacia contra las bacterias que normalmente combate.        Instrucciones generales     Jyothi suficiente líquido para mantener la orina de color amarillo pálido.  Descanse todo lo que pueda.  Retome meseret actividades normales según lo indicado por el médico. Pregúntele al médico qué actividades son seguras para usted.  Concurra a todas las visitas de seguimiento kim se lo haya indicado el médico. Trail es importante.    ¿Cómo se previene?     Para reducir el riesgo de tener angela enfermedad viral:    Lávese las kathy frecuentemente con agua y jabón hank al menos 20 segundos. Use desinfectante para kathy si no dispone de agua y jabón.  Evite tocarse la nariz, los ojos y la boca, sobre todo si no se lavó las kathy recientemente.  Si un miembro de la kermit tiene angela infección viral, limpie todas las superficies de la casa que puedan williams estado en contacto con el virus. Use [place] y jabón. También puede usar lejía con agua agregada (diluido).  Manténgase lejos de las personas enfermas con síntomas de angela infección viral.  No comparta objetos tales kim cepillos de dientes y botellas de agua con otras personas.  Mantenga las vacunas al día. Trail incluye recibir la vacuna antigripal todos los años.  Siga angela dieta saludable y descanse lo suficiente.    Comuníquese con un médico si:  Tiene síntomas de angela enfermedad viral que no desaparecen.  Los síntomas regresan después de williams desaparecido.  Meseret síntomas empeoran.    Solicite ayuda inmediatamente si tiene:  Dificultad para respirar.  Dolor de mahogany intenso o rigidez en el maxime.  Vómitos abundantes o dolor en el abdomen.    Estos síntomas pueden representar un problema grave que constituye angela emergencia. No espere a caitie si los síntomas desaparecen. Solicite atención médica de inmediato. Comuníquese con el servicio de emergencias de howell localidad (911 en los [place]). No conduzca por meseret propios medios hasta el hospital.    Resumen  Los virus son tipos de gérmenes que entran en el organismo de angela persona y causan enfermedades. Las enfermedades virales pueden ser leves o graves. Pueden afectar diferentes partes del cuerpo.  Los virus pueden ser difíciles de tratar. Hay medicamentos para aliviar los síntomas y hay algunos medicamentos antivirales.  Si le recetaron un medicamento antiviral, tómelo kim se lo haya indicado el médico. No deje de jeet el antiviral aunque comience a sentirse mejor.  Comuníquese con un médico si tiene síntomas de angela enfermedad viral que no desaparecen.    NOTAS ADICIONALES E INSTRUCCIONES    Please follow up with your Primary MD in 24-48 hr.  Seek immediate medical care for any new/worsening signs or symptoms.     Document Released: 4/28/2017 Document Revised: 10/27/2020 Document Reviewed: 10/27/2020  Elsevier Interactive Patient Education ©2019 [employer]. This information is not intended to replace advice given to you by your health care provider. Make sure you discuss any questions you have with your health care provider.

## 2024-03-21 NOTE — ED PROVIDER NOTE - CLINICAL SUMMARY MEDICAL DECISION MAKING FREE TEXT BOX
Patient well-appearing no acute distress advised supportive care acetaminophen for pain follow-up with OB

## 2024-03-21 NOTE — ED PROVIDER NOTE - PATIENT PORTAL LINK FT
You can access the FollowMyHealth Patient Portal offered by [facility] by registering at the following website: [email or web address]. By joining [employer]’s FollowMyHealth portal, you will also be able to view your health information using other applications (apps) compatible with our system.

## 2024-03-21 NOTE — ED PROVIDER NOTE - OBJECTIVE STATEMENT
Patient presents to ED for 2 days of myalgias low-grade temperatures.  Currently 4 months pregnant with normal prenatal care.  No chest pain or shortness of breath.  No abdominal pain.  No nausea vomiting diarrhea.  No vaginal bleeding.  No urinary frequency urgency or dysuria.  No recent high risk travel.  Normal fetal movement.

## 2024-03-21 NOTE — ED ADULT NURSE NOTE - CHIEF COMPLAINT QUOTE
Pt presents complaining of sore throat/ear pain/fevers/N/V x2 days. Denies medication PTA. currently 4 months pregnant. Denies PMH.

## 2024-03-27 ENCOUNTER — APPOINTMENT (OUTPATIENT)
Dept: ANTEPARTUM | Facility: CLINIC | Age: 34
End: 2024-03-27
Payer: MEDICAID

## 2024-03-27 ENCOUNTER — ASOB RESULT (OUTPATIENT)
Age: 34
End: 2024-03-27

## 2024-03-27 PROCEDURE — 76817 TRANSVAGINAL US OBSTETRIC: CPT

## 2024-03-27 PROCEDURE — 76811 OB US DETAILED SNGL FETUS: CPT

## 2024-05-15 NOTE — ED PROVIDER NOTE - HEME/LYMPH NEGATIVE STATEMENT, MLM
"Client Name: Екатерина Godinez       Client YOB: 1980    [facility] Safety Plan      Creation Date: 5/14/24    Created By: Edel Billy       Step 1: Warning Signs:   Warning Signs   \"curse a lot\"   \"crying\"            Step 2: Internal Coping Strategies:   Internal Coping Strategies   \"get out of the room\"   \"I go outside\"   \"clean\"   \"watch TV\"            Step 3: People and social settings that provide distraction:   Name Contact Information   \"my , my daughter\" \"lives with, number in phone\"    Places   \"travel to [place] or [place]\"           Step 4: People whom I can ask for help during a crisis:      Name Contact Information    \"my daughter, my mom, my sister the most\" \"number in phone\"    \"my \" \"number in phone\"      Step 5: Professionals or agencies I can contact during a crisis:        Crisis Phone Numbers:   Suicide Prevention Lifeline: Call or Text  969 Crisis Text Line: Text HOME to 403-223   Please note: Some [facility] do not have a separate number for Child/Adolescent specific crisis. If your county is not listed under Child/Adolescent, please call the adult number for your county      Adult Crisis Numbers: Child/Adolescent Crisis Numbers   [facility]: [phone] [facility]: [phone]   [facility]: [phone] [facility]: [phone]   [facility]: [phone] [place]: [phone]   [facility]: [phone] [place]: [phone]   [place]: [phone]   [facility]: [phone]   [facility]: [phone]   [facility]: [phone] (daytime) [phone] (after hours, weekends, holidays)      Step 6: Making the environment safer (plan for lethal means safety):   Plan: N/a     Optional: What is most important to me and worth living for?   \"My kids, my family\"     [facility] Safety Plan. [employer]. Used with permission of the authors.   " no anemia, no easy bruising, no jaundice, no swollen lymph nodes.

## 2024-07-17 ENCOUNTER — ASOB RESULT (OUTPATIENT)
Age: 34
End: 2024-07-17

## 2024-07-17 ENCOUNTER — APPOINTMENT (OUTPATIENT)
Dept: ANTEPARTUM | Facility: CLINIC | Age: 34
End: 2024-07-17
Payer: MEDICAID

## 2024-07-17 PROCEDURE — 76816 OB US FOLLOW-UP PER FETUS: CPT

## 2024-07-17 PROCEDURE — 76819 FETAL BIOPHYS PROFIL W/O NST: CPT | Mod: 59

## 2024-08-09 ENCOUNTER — OUTPATIENT (OUTPATIENT)
Dept: INPATIENT UNIT | Facility: HOSPITAL | Age: 34
LOS: 1 days | Discharge: HOME | End: 2024-08-09
Payer: MEDICAID

## 2024-08-09 VITALS
DIASTOLIC BLOOD PRESSURE: 61 MMHG | RESPIRATION RATE: 20 BRPM | HEART RATE: 90 BPM | SYSTOLIC BLOOD PRESSURE: 115 MMHG | OXYGEN SATURATION: 98 % | TEMPERATURE: 98 F

## 2024-08-09 DIAGNOSIS — O26.899 OTHER SPECIFIED PREGNANCY RELATED CONDITIONS, UNSPECIFIED TRIMESTER: ICD-10-CM

## 2024-08-09 PROCEDURE — 59025 FETAL NON-STRESS TEST: CPT | Mod: 26

## 2024-08-09 PROCEDURE — 99214 OFFICE O/P EST MOD 30 MIN: CPT

## 2024-08-09 PROCEDURE — 99221 1ST HOSP IP/OBS SF/LOW 40: CPT | Mod: 25

## 2024-08-09 PROCEDURE — 59025 FETAL NON-STRESS TEST: CPT

## 2024-08-09 NOTE — OB RN TRIAGE NOTE - FALL HARM RISK - UNIVERSAL INTERVENTIONS
Bed in lowest position, wheels locked, appropriate side rails in place/Call bell, personal items and telephone in reach/Instruct patient to call for assistance before getting out of bed or chair/Non-slip footwear when patient is out of bed/[place] to call system/Physically safe environment - no spills, clutter or unnecessary equipment/Purposeful Proactive Rounding/Room/bathroom lighting operational, light cord in reach

## 2024-08-09 NOTE — OB RN TRIAGE NOTE - NS_ZIKATRAVEL_OBGYN_ALL_OB
Spoke with patient he is aware per Dr. Priya Medrano FT4 0.98, TSH 15.930 and he should be on synthroid. Please send Rx and when should he repeat labs? No

## 2024-08-10 PROBLEM — K21.9 GASTRO-ESOPHAGEAL REFLUX DISEASE WITHOUT ESOPHAGITIS: Chronic | Status: ACTIVE | Noted: 2024-08-01

## 2024-08-10 NOTE — OB PROVIDER TRIAGE NOTE - NSHPPHYSICALEXAM_GEN_ALL_CORE
T(C): 36.9 (08-01-24 @ 23:19), Max: 36.9 (08-01-24 @ 23:19)  HR: 92 (08-01-24 @ 23:19) (92 - 92)  BP: 117/65 (08-01-24 @ 23:19) (117/65 - 117/65)  RR: 20 (08-01-24 @ 23:19) (20 - 20)  SpO2: 98% (08-01-24 @ 23:19) (98% - 98%)  Gen: NAD, well-appearing  Abd: soft, gravid  Ext: non-edematous, non-tender   SVE: 2/50/-3, firm, posterior    FHT: 130 baseline, moderate variability, +accels, -decels, cat 1 / reactive  [place]: q2 min
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
## 2024-08-10 NOTE — OB PROVIDER TRIAGE NOTE - NSOBPROVIDERNOTE_OBGYN_ALL_OB_FT
A/P: 34y  at 39w3d weeks GA who presents to L&D for contraction pain. Rule out labor    SVE: /-3 > 2 hours later unchanged  Fetus: Reactive and reassuring  [place]: q2-5 min, will po hydrate and observe to see if her contractions deny.  Not in labor at this time      Has appointment with Dr. Jacob on Friday  Dispo: Discharge home  Return precautions reviewed    Discussed with Dr. Norris A/P: 34y  at 39w3d weeks GA who presents to L&D for contraction pain. Rule out labor    SVE: /-3 > 2 hours later unchanged  Fetus: Reactive and reassuring  [place]: q2-5 min, will po hydrate and observe to see if her contractions deny.  Not in labor at this time  --  2:33 AM  contraction not improved with po hydration. Pt does not want iv hydration. Offered pain medication, pt also declines.  Adv pt to go home to rest   Return precautions discussed.  Repeat cervical exam, unchanged.

## 2024-08-13 DIAGNOSIS — O47.1 FALSE LABOR AT OR AFTER 37 COMPLETED WEEKS OF GESTATION: ICD-10-CM

## 2024-08-13 DIAGNOSIS — Z3A.39 39 WEEKS GESTATION OF PREGNANCY: ICD-10-CM

## 2024-08-13 DIAGNOSIS — O09.293 SUPERVISION OF PREGNANCY WITH OTHER POOR REPRODUCTIVE OR OBSTETRIC HISTORY, THIRD TRIMESTER: ICD-10-CM

## 2024-08-14 ENCOUNTER — TRANSCRIPTION ENCOUNTER (OUTPATIENT)
Age: 34
End: 2024-08-14

## 2024-12-12 ENCOUNTER — EMERGENCY (EMERGENCY)
Facility: HOSPITAL | Age: 34
LOS: 0 days | Discharge: HOME | End: 2024-12-12
Attending: EMERGENCY MEDICINE
Payer: COMMERCIAL

## 2024-12-12 VITALS
DIASTOLIC BLOOD PRESSURE: 63 MMHG | TEMPERATURE: 98 F | HEART RATE: 98 BPM | SYSTOLIC BLOOD PRESSURE: 114 MMHG | RESPIRATION RATE: 18 BRPM | OXYGEN SATURATION: 100 %
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
## 2024-12-12 DIAGNOSIS — M54.9 DORSALGIA, UNSPECIFIED: ICD-10-CM
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
## 2024-12-12 PROCEDURE — 99282 EMERGENCY DEPT VISIT SF MDM: CPT

## 2024-12-12 PROCEDURE — 99283 EMERGENCY DEPT VISIT LOW MDM: CPT

## 2024-12-12 RX ORDER — IBUPROFEN 200 MG
400 TABLET ORAL ONCE
Refills: 0 | Status: COMPLETED | OUTPATIENT
Start: 2024-12-12 | End: 2024-12-12

## 2024-12-12 RX ADMIN — Medication 400 MILLIGRAM(S): at 21:36
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
## 2024-12-12 NOTE — ED STATDOCS - PATIENT PORTAL LINK FT
You can access the FollowMyHealth Patient Portal offered by [facility] by registering at the following website: [email or web address]. By joining [facility]’s FollowMyHealth portal, you will also be able to view your health information using other applications (apps) compatible with our system.

## 2024-12-12 NOTE — ED STATDOCS - PHYSICAL EXAMINATION
GEN - NAD; well appearing; A+O x3  HEAD - NC/AT    EYES - EOMI, no conjunctival pallor, no scleral icterus  ENT -   mucous membranes  moist , no discharge  PULM - CTA b/l,  symmetric breath sounds  COR -  RRR, S1 S2, no murmurs  ABD - ND, NT, soft, no guarding, no rebound, no masses    EXTREMS -no edema, no deformity, warm and well perfused   spine-right paraspinal thoracic tenderness to palpation no midline tenderness.  SKIN - no rash or bruising      NEUROLOGIC - alert, sensation nl, motor 5/5 RUE/LUE/RLE/LLE
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
## 2025-06-26 NOTE — ED ADULT TRIAGE NOTE - CHIEF COMPLAINT QUOTE
vit b12 Pt presents complaining of sore throat/ear pain/fevers/N/V x2 days. Denies medication PTA. currently 4 months pregnant. Denies PMH.